# Patient Record
Sex: MALE | Race: WHITE
[De-identification: names, ages, dates, MRNs, and addresses within clinical notes are randomized per-mention and may not be internally consistent; named-entity substitution may affect disease eponyms.]

---

## 2020-05-16 ENCOUNTER — HOSPITAL ENCOUNTER (EMERGENCY)
Dept: HOSPITAL 4 - SED | Age: 55
LOS: 1 days | Discharge: ANOTHER HOSPITAL | End: 2020-05-17
Payer: MEDICAID

## 2020-05-16 VITALS — SYSTOLIC BLOOD PRESSURE: 132 MMHG

## 2020-05-16 VITALS — BODY MASS INDEX: 23.63 KG/M2 | HEIGHT: 66 IN | WEIGHT: 147 LBS

## 2020-05-16 DIAGNOSIS — F10.20: ICD-10-CM

## 2020-05-16 DIAGNOSIS — S09.90XA: ICD-10-CM
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
## 2020-05-16 DIAGNOSIS — S32.009A: Primary | ICD-10-CM
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
## 2020-05-16 DIAGNOSIS — S22.9XXA: ICD-10-CM

## 2020-05-16 DIAGNOSIS — F17.200: ICD-10-CM
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
## 2020-05-16 PROCEDURE — 72131 CT LUMBAR SPINE W/O DYE: CPT

## 2020-05-16 PROCEDURE — 70450 CT HEAD/BRAIN W/O DYE: CPT

## 2020-05-16 PROCEDURE — 36415 COLL VENOUS BLD VENIPUNCTURE: CPT

## 2020-05-16 PROCEDURE — 80053 COMPREHEN METABOLIC PANEL: CPT

## 2020-05-16 PROCEDURE — 85730 THROMBOPLASTIN TIME PARTIAL: CPT

## 2020-05-16 PROCEDURE — G0482 DRUG TEST DEF 15-21 CLASSES: HCPCS

## 2020-05-16 PROCEDURE — 71045 X-RAY EXAM CHEST 1 VIEW: CPT

## 2020-05-16 PROCEDURE — 72125 CT NECK SPINE W/O DYE: CPT

## 2020-05-16 PROCEDURE — 85610 PROTHROMBIN TIME: CPT

## 2020-05-16 PROCEDURE — 72128 CT CHEST SPINE W/O DYE: CPT

## 2020-05-16 PROCEDURE — 99285 EMERGENCY DEPT VISIT HI MDM: CPT

## 2020-05-16 PROCEDURE — 85025 COMPLETE CBC W/AUTO DIFF WBC: CPT

## 2020-05-16 NOTE — NUR
Pt. taken to Bed #2. Placed on monitor with side rails up. Report given by 
JNUG Elise for continuation of care.

## 2020-05-16 NOTE — NUR
-------------------------------------------------------------------------------

            *** Note undone in EDM - 05/16/20 at 2337 by CECIL ***            

-------------------------------------------------------------------------------

Pt. presents to the ED BLS with c/o of head pain r/t fall that he said happened 
a few hours earlier. He is A&O x3 and denies fever, n/v/c, loss of vision, 
sleepiness, and other symptoms at this time. Pt has open abrasions to the 
entire anterior aspect of the forehead. VSS. Will continue to monitor pt.

## 2020-05-16 NOTE — NUR
Pt. presents to the ED BLS with c/o of head pain r/t fall that he said happened 
a few hours earlier and that he drank a pint of vodka today. He is A&O x3 and 
denies fever, n/v/c, loss of vision, sleepiness, and other symptoms at this 
time. Pt has open abrasions to the entire anterior aspect of the forehead. VSS. 
Will continue to monitor pt.

## 2020-05-17 VITALS — SYSTOLIC BLOOD PRESSURE: 118 MMHG

## 2020-05-17 LAB
ALBUMIN SERPL BCP-MCNC: 3.9 G/DL (ref 3.4–4.8)
ALT SERPL W P-5'-P-CCNC: 28 U/L (ref 12–78)
ANION GAP SERPL CALCULATED.3IONS-SCNC: 8 MMOL/L (ref 5–15)
AST SERPL W P-5'-P-CCNC: 23 U/L (ref 10–37)
BASOPHILS # BLD AUTO: 0.1 K/UL (ref 0–0.2)
BASOPHILS NFR BLD AUTO: 1.1 % (ref 0–2)
BILIRUB SERPL-MCNC: 0.3 MG/DL (ref 0–1)
BUN SERPL-MCNC: 14 MG/DL (ref 8–21)
CALCIUM SERPL-MCNC: 8.7 MG/DL (ref 8.4–11)
CHLORIDE SERPL-SCNC: 104 MMOL/L (ref 98–107)
CREAT SERPL-MCNC: 0.79 MG/DL (ref 0.55–1.3)
EOSINOPHIL # BLD AUTO: 0.1 K/UL (ref 0–0.4)
EOSINOPHIL NFR BLD AUTO: 2.3 % (ref 0–4)
ERYTHROCYTE [DISTWIDTH] IN BLOOD BY AUTOMATED COUNT: 14.3 % (ref 9–15)
ETHANOL SERPL-MCNC: 6 MG/DL (ref ?–10)
GFR SERPL CREATININE-BSD FRML MDRD: 130 ML/MIN (ref 90–?)
GLUCOSE SERPL-MCNC: 98 MG/DL (ref 70–99)
HCT VFR BLD AUTO: 45 % (ref 36–54)
HGB BLD-MCNC: 15.2 G/DL (ref 14–18)
INR PPP: 1.2 (ref 0.8–1.2)
LYMPHOCYTES # BLD AUTO: 1.8 K/UL (ref 1–5.5)
LYMPHOCYTES NFR BLD AUTO: 35.9 % (ref 20.5–51.5)
MCH RBC QN AUTO: 33 PG (ref 27–31)
MCHC RBC AUTO-ENTMCNC: 34 % (ref 32–36)
MCV RBC AUTO: 98 FL (ref 79–98)
MONOCYTES # BLD MANUAL: 0.5 K/UL (ref 0–1)
MONOCYTES # BLD MANUAL: 9 % (ref 1.7–9.3)
NEUTROPHILS # BLD AUTO: 2.7 K/UL (ref 1.8–7.7)
NEUTROPHILS NFR BLD AUTO: 51.7 % (ref 40–70)
PLATELET # BLD AUTO: 185 K/UL (ref 130–430)
POTASSIUM SERPL-SCNC: 4 MMOL/L (ref 3.5–5.1)
PROTHROMBIN TIME: 11.8 SECS (ref 9.5–12.5)
RBC # BLD AUTO: 4.59 MIL/UL (ref 4.2–6.2)
SODIUM SERPLBLD-SCNC: 140 MMOL/L (ref 136–145)
WBC # BLD AUTO: 5.1 K/UL (ref 4.8–10.8)

## 2020-05-17 NOTE — NUR
# 18 gauge angiocath placed to RFA.  Use of asceptic technique.  Opsite placed 
over site.  Blood return noted.  Flushed with 10 cc of normal saline.  No 
evidence of infiltration noted.  Patient tolerated well.

## 2020-05-17 NOTE — NUR
MD notified that patient is refusing IV at this time.  Patient requesting blood 
draw only.   MD notified.   Will continue to monitor

## 2020-05-17 NOTE — NUR
Patient to be transferred to [facility].  Is being 
transferred due to higher level of care.  Receiving facility has accepting 
physician and available space. ER physician has signed transfer form.  Patient 
or responsible party has agreed to transfer and signed form.  Patient 
belongings inventoried and will be sent with patient.  Copy of nursing notes, 
lab reports, EKG, Physicians Orders and X-rays to be sent with patient.  Report 
called to JUNG Howard at receiving facility. Receiving physician is Dr. Skelton. 
[facility] service has been called for transfer.  ETA is [id number].
